# Patient Record
Sex: FEMALE | Race: WHITE | NOT HISPANIC OR LATINO | URBAN - METROPOLITAN AREA
[De-identification: names, ages, dates, MRNs, and addresses within clinical notes are randomized per-mention and may not be internally consistent; named-entity substitution may affect disease eponyms.]

---

## 2021-12-16 ENCOUNTER — EMERGENCY (EMERGENCY)
Facility: HOSPITAL | Age: 31
LOS: 1 days | Discharge: HOME | End: 2021-12-16
Attending: EMERGENCY MEDICINE | Admitting: EMERGENCY MEDICINE
Payer: COMMERCIAL

## 2021-12-16 VITALS
RESPIRATION RATE: 28 BRPM | OXYGEN SATURATION: 100 % | TEMPERATURE: 98 F | HEART RATE: 107 BPM | DIASTOLIC BLOOD PRESSURE: 78 MMHG | SYSTOLIC BLOOD PRESSURE: 112 MMHG

## 2021-12-16 DIAGNOSIS — U07.1 COVID-19: ICD-10-CM

## 2021-12-16 DIAGNOSIS — M79.10 MYALGIA, UNSPECIFIED SITE: ICD-10-CM

## 2021-12-16 PROCEDURE — 71045 X-RAY EXAM CHEST 1 VIEW: CPT | Mod: 26

## 2021-12-16 PROCEDURE — 99284 EMERGENCY DEPT VISIT MOD MDM: CPT | Mod: 25

## 2021-12-16 NOTE — ED ADULT NURSE NOTE - OBJECTIVE STATEMENT
32yo female, hysterical, hyperventilating and anxious, states "my boyfriend tested positive for COVID this morning and they said his viral load was through the roof... I've been in bed all day." Pt states symptoms of shortness of breath, sore throat and headache began after finding out boyfriend was positive and "it's all coming on so fast." Denies fevers/chills. SpO2 100% RA. Pt also states she has anxiety.

## 2021-12-17 VITALS — RESPIRATION RATE: 18 BRPM | OXYGEN SATURATION: 99 %

## 2021-12-17 LAB
ALBUMIN SERPL ELPH-MCNC: 4.1 G/DL — SIGNIFICANT CHANGE UP (ref 3.3–5)
ALP SERPL-CCNC: SIGNIFICANT CHANGE UP U/L (ref 40–120)
ALT FLD-CCNC: SIGNIFICANT CHANGE UP U/L (ref 10–45)
ANION GAP SERPL CALC-SCNC: 9 MMOL/L — SIGNIFICANT CHANGE UP (ref 5–17)
APPEARANCE UR: CLEAR — SIGNIFICANT CHANGE UP
AST SERPL-CCNC: SIGNIFICANT CHANGE UP U/L (ref 10–40)
BASOPHILS # BLD AUTO: 0.03 K/UL — SIGNIFICANT CHANGE UP (ref 0–0.2)
BASOPHILS NFR BLD AUTO: 0.4 % — SIGNIFICANT CHANGE UP (ref 0–2)
BILIRUB SERPL-MCNC: 0.2 MG/DL — SIGNIFICANT CHANGE UP (ref 0.2–1.2)
BILIRUB UR-MCNC: NEGATIVE — SIGNIFICANT CHANGE UP
BUN SERPL-MCNC: 11 MG/DL — SIGNIFICANT CHANGE UP (ref 7–23)
CALCIUM SERPL-MCNC: 9.2 MG/DL — SIGNIFICANT CHANGE UP (ref 8.4–10.5)
CHLORIDE SERPL-SCNC: 106 MMOL/L — SIGNIFICANT CHANGE UP (ref 96–108)
CO2 SERPL-SCNC: 21 MMOL/L — LOW (ref 22–31)
COLOR SPEC: YELLOW — SIGNIFICANT CHANGE UP
CREAT SERPL-MCNC: 0.66 MG/DL — SIGNIFICANT CHANGE UP (ref 0.5–1.3)
D DIMER BLD IA.RAPID-MCNC: <150 NG/ML DDU — SIGNIFICANT CHANGE UP
DIFF PNL FLD: NEGATIVE — SIGNIFICANT CHANGE UP
EOSINOPHIL # BLD AUTO: 0.13 K/UL — SIGNIFICANT CHANGE UP (ref 0–0.5)
EOSINOPHIL NFR BLD AUTO: 1.8 % — SIGNIFICANT CHANGE UP (ref 0–6)
GLUCOSE SERPL-MCNC: 106 MG/DL — HIGH (ref 70–99)
GLUCOSE UR QL: NEGATIVE — SIGNIFICANT CHANGE UP
HCG SERPL-ACNC: <0 MIU/ML — SIGNIFICANT CHANGE UP
HCT VFR BLD CALC: 40.3 % — SIGNIFICANT CHANGE UP (ref 34.5–45)
HGB BLD-MCNC: 13.1 G/DL — SIGNIFICANT CHANGE UP (ref 11.5–15.5)
IMM GRANULOCYTES NFR BLD AUTO: 0.4 % — SIGNIFICANT CHANGE UP (ref 0–1.5)
KETONES UR-MCNC: NEGATIVE — SIGNIFICANT CHANGE UP
LACTATE SERPL-SCNC: 1.1 MMOL/L — SIGNIFICANT CHANGE UP (ref 0.5–2)
LEUKOCYTE ESTERASE UR-ACNC: NEGATIVE — SIGNIFICANT CHANGE UP
LIDOCAIN IGE QN: 19 U/L — SIGNIFICANT CHANGE UP (ref 7–60)
LYMPHOCYTES # BLD AUTO: 0.84 K/UL — LOW (ref 1–3.3)
LYMPHOCYTES # BLD AUTO: 11.4 % — LOW (ref 13–44)
MAGNESIUM SERPL-MCNC: 2 MG/DL — SIGNIFICANT CHANGE UP (ref 1.6–2.6)
MCHC RBC-ENTMCNC: 28.6 PG — SIGNIFICANT CHANGE UP (ref 27–34)
MCHC RBC-ENTMCNC: 32.5 GM/DL — SIGNIFICANT CHANGE UP (ref 32–36)
MCV RBC AUTO: 88 FL — SIGNIFICANT CHANGE UP (ref 80–100)
MONOCYTES # BLD AUTO: 0.63 K/UL — SIGNIFICANT CHANGE UP (ref 0–0.9)
MONOCYTES NFR BLD AUTO: 8.6 % — SIGNIFICANT CHANGE UP (ref 2–14)
NEUTROPHILS # BLD AUTO: 5.69 K/UL — SIGNIFICANT CHANGE UP (ref 1.8–7.4)
NEUTROPHILS NFR BLD AUTO: 77.4 % — HIGH (ref 43–77)
NITRITE UR-MCNC: NEGATIVE — SIGNIFICANT CHANGE UP
NRBC # BLD: 0 /100 WBCS — SIGNIFICANT CHANGE UP (ref 0–0)
PH UR: 6 — SIGNIFICANT CHANGE UP (ref 5–8)
PLATELET # BLD AUTO: 274 K/UL — SIGNIFICANT CHANGE UP (ref 150–400)
POTASSIUM SERPL-MCNC: SIGNIFICANT CHANGE UP MMOL/L (ref 3.5–5.3)
POTASSIUM SERPL-SCNC: SIGNIFICANT CHANGE UP MMOL/L (ref 3.5–5.3)
PROT SERPL-MCNC: 7.5 G/DL — SIGNIFICANT CHANGE UP (ref 6–8.3)
PROT UR-MCNC: NEGATIVE MG/DL — SIGNIFICANT CHANGE UP
RBC # BLD: 4.58 M/UL — SIGNIFICANT CHANGE UP (ref 3.8–5.2)
RBC # FLD: 13.2 % — SIGNIFICANT CHANGE UP (ref 10.3–14.5)
SARS-COV-2 RNA SPEC QL NAA+PROBE: DETECTED
SODIUM SERPL-SCNC: 136 MMOL/L — SIGNIFICANT CHANGE UP (ref 135–145)
SP GR SPEC: 1.01 — SIGNIFICANT CHANGE UP (ref 1–1.03)
TROPONIN T SERPL-MCNC: <0.01 NG/ML — SIGNIFICANT CHANGE UP (ref 0–0.01)
UROBILINOGEN FLD QL: 0.2 E.U./DL — SIGNIFICANT CHANGE UP
WBC # BLD: 7.35 K/UL — SIGNIFICANT CHANGE UP (ref 3.8–10.5)
WBC # FLD AUTO: 7.35 K/UL — SIGNIFICANT CHANGE UP (ref 3.8–10.5)

## 2021-12-17 PROCEDURE — 87637 SARSCOV2&INF A&B&RSV AMP PRB: CPT

## 2021-12-17 PROCEDURE — 71045 X-RAY EXAM CHEST 1 VIEW: CPT

## 2021-12-17 PROCEDURE — 36415 COLL VENOUS BLD VENIPUNCTURE: CPT

## 2021-12-17 PROCEDURE — 83605 ASSAY OF LACTIC ACID: CPT

## 2021-12-17 PROCEDURE — 83735 ASSAY OF MAGNESIUM: CPT

## 2021-12-17 PROCEDURE — 84702 CHORIONIC GONADOTROPIN TEST: CPT

## 2021-12-17 PROCEDURE — 81003 URINALYSIS AUTO W/O SCOPE: CPT

## 2021-12-17 PROCEDURE — 96374 THER/PROPH/DIAG INJ IV PUSH: CPT

## 2021-12-17 PROCEDURE — 96361 HYDRATE IV INFUSION ADD-ON: CPT

## 2021-12-17 PROCEDURE — 85025 COMPLETE CBC W/AUTO DIFF WBC: CPT

## 2021-12-17 PROCEDURE — 71045 X-RAY EXAM CHEST 1 VIEW: CPT | Mod: 26

## 2021-12-17 PROCEDURE — U0005: CPT

## 2021-12-17 PROCEDURE — 84484 ASSAY OF TROPONIN QUANT: CPT

## 2021-12-17 PROCEDURE — 85379 FIBRIN DEGRADATION QUANT: CPT

## 2021-12-17 PROCEDURE — 83690 ASSAY OF LIPASE: CPT

## 2021-12-17 PROCEDURE — 80053 COMPREHEN METABOLIC PANEL: CPT

## 2021-12-17 PROCEDURE — U0003: CPT

## 2021-12-17 PROCEDURE — 99284 EMERGENCY DEPT VISIT MOD MDM: CPT | Mod: 25

## 2021-12-17 RX ORDER — SODIUM CHLORIDE 9 MG/ML
1000 INJECTION INTRAMUSCULAR; INTRAVENOUS; SUBCUTANEOUS ONCE
Refills: 0 | Status: COMPLETED | OUTPATIENT
Start: 2021-12-17 | End: 2021-12-17

## 2021-12-17 RX ORDER — KETOROLAC TROMETHAMINE 30 MG/ML
30 SYRINGE (ML) INJECTION ONCE
Refills: 0 | Status: DISCONTINUED | OUTPATIENT
Start: 2021-12-17 | End: 2021-12-17

## 2021-12-17 RX ADMIN — SODIUM CHLORIDE 1000 MILLILITER(S): 9 INJECTION INTRAMUSCULAR; INTRAVENOUS; SUBCUTANEOUS at 03:30

## 2021-12-17 RX ADMIN — Medication 30 MILLIGRAM(S): at 04:05

## 2021-12-17 RX ADMIN — SODIUM CHLORIDE 1000 MILLILITER(S): 9 INJECTION INTRAMUSCULAR; INTRAVENOUS; SUBCUTANEOUS at 02:16

## 2021-12-17 RX ADMIN — Medication 30 MILLIGRAM(S): at 02:16

## 2021-12-17 RX ADMIN — Medication 0.5 MILLIGRAM(S): at 02:16

## 2021-12-17 NOTE — ED PROVIDER NOTE - PHYSICAL EXAMINATION
CONSTITUTIONAL: NAD but appears anxious  SKIN: Normal color and turgor.    HEAD: NC/AT.  EYES: Conjunctiva clear. EOMI. PERRL.    ENT: Airway clear. Normal voice.   RESPIRATORY:  Normal work of breathing. Lungs CTAB.  CARDIOVASCULAR:  RRR, S1S2. No M/R/G.      GI:  Abdomen soft, nontender.    MSK: Neck supple.  No lower extremity edema or calf tenderness.  No joint swelling or ROM limitation.  NEURO: Alert and oriented; CN II-XII grossly intact. Speech clear. 5/5 strength in all extremities.  Good balance. Steady gait.

## 2021-12-17 NOTE — ED PROVIDER NOTE - NSFOLLOWUPINSTRUCTIONS_ED_ALL_ED_FT
Quarantine at home, rest, stay hydrated.  Check pulse oximetry twice daily.  Ibuprofen if needed for fever/pain, use as directed.  Call primary care physician tomorrow.  Return to the Emergency Department if you have any new or worsening symptoms, or if you have any concerns.  ======================    Oximetry    Oximetry is a technology that measures the oxygen level in the blood. This is also known as oxygen saturation level. This measurement is taken with a device called a pulse oximeter. This device also measures the heart rate (pulse). The measurement helps to assess:  •A person's oxygen level and breathing.      •The need for or effectiveness of oxygen therapy or other treatments for lung disease.      •How well a person can tolerate increased activity.      If a more accurate measurement is needed, a blood sample will be taken.      How is an oximetry reading obtained?   •A tape sensor or clip with a light source and a light detector will be placed on an area of the body.  •For children and adults, the sensor is usually placed on a finger, with the light centered over the nail bed. The sensor may also be placed on an earlobe or toe.      •For babies, a tape sensor is usually placed around areas such as the sole of a foot or the palm of a hand.        •The device will beam light through the skin and blood. This cannot be felt.      •The levels of light received by the detector will be measured, and the percentage of blood cells carrying oxygen will be determined.      Oximetry may be used continuously or may be used at specified intervals.      Are there any risks associated with oximetry?    The risks associated with oximetry are rare. However, there is a risk of skin breakdown if a sensor is left in the same spot for long periods of time.      What can affect the accuracy of the oximetry reading?  Pulse oximetry depends on the amount of light absorbed as it passes through skin tissue. Because of this, the accuracy of this measurement can be affected by one or more of the following:•Factors such as:  •Dark nail polish or artificial nails.      •Very dark skin.      •Shivering or too much movement.      •Bright, artificial lighting.      •Long-term, or chronic, smoking and recent breathing-in (inhalation) of smoke or carbon monoxide.      •Conditions such as:  •Extreme coolness or poor blood flow to the area where the sensor is placed.      •Excessive sweating or extreme warmth of the area where the sensor is placed.      •Anemia, or low levels of hemoglobin or red blood cells.      •Polycythemia vera. This is a bone marrow disease that causes high levels of red blood cells, white blood cells, and platelets.          What is the meaning of the oximetry reading?  The normal value depends on your medical history and your elevation above sea level.  •Most healthy people have oxygen saturation levels between 95% and 100%.      •Low oxygen saturation levels are below 90%. This may happen in people with lung conditions, such as chronic obstructive pulmonary disease (COPD). In this case, supplemental oxygen therapy may be needed.        Summary    •Oximetry uses a small device to measure the oxygen level in the blood.      •The light in the sensor cannot be felt. The risks associated with oximetry are rare.      •Most healthy people have oxygen levels between 95% and 100%. A low oxygen saturation level is below 90%.      •People with low oxygen levels may need supplemental oxygen.      This information is not intended to replace advice given to you by your health care provider. Make sure you discuss any questions you have with your health care provider.

## 2021-12-17 NOTE — ED PROVIDER NOTE - PATIENT PORTAL LINK FT
You can access the FollowMyHealth Patient Portal offered by [facility] by registering at the following website: [email or web address]. By joining [employer]’s FollowMyHealth portal, you will also be able to view your health information using other applications (apps) compatible with our system.

## 2021-12-17 NOTE — ED PROVIDER NOTE - NS ED ROS FT
CONSTITUTIONAL: fever  NEURO: No headache, no dizziness, no syncope; No focal weakness/tingling/numbness  EYES: No visual changes  ENT: Nasal congestion and sore throat  PULM: No cough or dyspnea  CV: No chest pain or palpitations  GI: No abdominal pain, vomiting, or diarrhea  : No dysuria, hematuria, frequency  MSK: No neck pain or back pain, no joint pain  SKIN: no rash or unusual bruising CONSTITUTIONAL: fever  NEURO: No headache, no dizziness, no syncope; No focal weakness/tingling/numbness  EYES: No visual changes  ENT: Nasal congestion and sore throat  PULM: No cough or dyspnea  CV: No chest pain or palpitations  GI: abd cramps, no NVD  : No dysuria, hematuria, frequency  MSK: No neck pain or back pain, no joint pain  SKIN: no rash or unusual bruising

## 2021-12-17 NOTE — ED PROVIDER NOTE - OBJECTIVE STATEMENT
32 yo fem with hx anxiety, depression, ADD  c/o body aches, feeling hot, nasal congstion and diff breathing through nose for apx 12-14 hrs.  says her live-in boyfriend tested positive for covid today, and he is asymptomatic.  she has no cough.  says she has some pain across the chest sometimes, and some pain in the LUQ of abdomen sometimes, but she does not have these symptoms currently. the pain in abdomen was crampy, but there has been no NVD. on arrival, pt was reportedly hyperventilating in triage, and patient confirms this.      no hx of DVT/PE, heart disease, HTN, HLD, DM, asthma/lung disease.  never smoker  no estrogen use, no recent immobilization, no malignancy, not pregnant, no leg pain or swelling    meds: duloxetine, adderall,  alprazolam  vaccinated for covid, no booster 30 yo fem with hx anxiety, depression, ADD  c/o body aches, feeling hot, nasal congstion and diff breathing through nose for apx 12-14 hrs.  has a sore throat. says her live-in boyfriend tested positive for covid today, and he is asymptomatic.  she has no cough.  reports some pain in the LUQ of abdomen sometimes, but none currently. the pain in abdomen was crampy, but there has been no NVD. on arrival, pt was reportedly hyperventilating in triage, and patient confirms this.      no hx of DVT/PE, heart disease, HTN, HLD, DM, asthma/lung disease.  never smoker  no estrogen use, no recent immobilization, no malignancy, not pregnant, no leg pain or swelling    meds: duloxetine, adderall,  alprazolam  vaccinated for covid, no booster

## 2021-12-17 NOTE — ED PROVIDER NOTE - ATTENDING CONTRIBUTION TO CARE
31F hx anxiety/depression, add, c/o bodyaches, congestion, subjective fevers.  pt states difficult to breath through her nose for past 21hrs.  states boyfriend tested positive for covid.  no n/v/d. no cough.   gen- nad  heent- ncat, clear conj  cv -rrr  lungs -ctab  abd - soft, nt, nd  ext -wwp, no edema  neuro -aox3, steady gait, subramanian  no resp distress, speaking in full sentences, no hypoxia, labs sent. given toradol, ativan, ivf.

## 2021-12-17 NOTE — ED PROVIDER NOTE - CLINICAL SUMMARY MEDICAL DECISION MAKING FREE TEXT BOX
pt with 1 day fever, body aches, nasal congestion.  strongly suspicious for viral resp infection.  tachycardic initially and hyperventilation in triage.  on exam appears nontoxic.  very anxious. euvolemic, lungs clear, abd benign, no calf tend or swelling.  plan: IV fluids, NSAID, low dose anxiolytic. will test for covid, basic labs, CXR.  low clinical susp for PE, will check d-dimer.

## 2024-07-26 ENCOUNTER — APPOINTMENT (OUTPATIENT)
Age: 34
End: 2024-07-26

## 2024-07-26 VITALS
WEIGHT: 150.31 LBS | DIASTOLIC BLOOD PRESSURE: 71 MMHG | OXYGEN SATURATION: 97 % | HEIGHT: 55 IN | HEART RATE: 99 BPM | SYSTOLIC BLOOD PRESSURE: 97 MMHG | BODY MASS INDEX: 34.79 KG/M2

## 2024-07-26 DIAGNOSIS — Z01.419 ENCOUNTER FOR GYNECOLOGICAL EXAMINATION (GENERAL) (ROUTINE) W/OUT ABNORMAL FINDINGS: ICD-10-CM

## 2024-07-26 DIAGNOSIS — Z80.3 FAMILY HISTORY OF MALIGNANT NEOPLASM OF BREAST: ICD-10-CM

## 2024-07-26 DIAGNOSIS — N80.9 ENDOMETRIOSIS, UNSPECIFIED: ICD-10-CM

## 2024-07-26 PROBLEM — Z00.00 ENCOUNTER FOR PREVENTIVE HEALTH EXAMINATION: Status: ACTIVE | Noted: 2024-07-26

## 2024-07-26 PROCEDURE — 99204 OFFICE O/P NEW MOD 45 MIN: CPT

## 2024-07-26 PROCEDURE — 99459 PELVIC EXAMINATION: CPT

## 2024-07-26 NOTE — PLAN
[FreeTextEntry1] : -Pap + cotesting -Mammogram referral/ultrasound - MRI pelvis - Vaginitis panel  RTO for FU with Dr. Mtz for endometriosis vs. hyst consult

## 2024-07-26 NOTE — PHYSICAL EXAM
[24508] : A chaperone was present during the pelvic exam. [FreeTextEntry2] : MICHELLE Marley [Appropriately responsive] : appropriately responsive [Alert] : alert [Examination Of The Breasts] : a normal appearance [No Masses] : no breast masses were palpable [Normal] : normal [FreeTextEntry4] : +odor [FreeTextEntry8] : 7week sized, mobile, nontender

## 2024-07-26 NOTE — HISTORY OF PRESENT ILLNESS
[FreeTextEntry1] : 32yo G0 LMP: 22 here for WWE and discuss interest in hysterectomy. Patient states that she has had long history of very painful menses that have caused her to faint from pain and causing her to miss work/school and have inability to function 2/2 pain for years with multiple ED visits for pain control. Currently, dysmenorrhea does not happen every menstrual period, but is interested in proceeding with hysterectomy due to not wanting children in the future. Took OCPs years ago but stopped, states tylenol/ibuprofen causes no relief, never tried [place] or IUD, no sx for official endo diagnosis, and not currently interested. Patient denies pain on intercourse or bowel movements, or pain outside of menses. Patient discussed that possible endo excision vs. hysterectomy. Patient also has family history of mother being diagnosed with breast cancer in 30s, with unknown genetic testing. Will do today. Discussed will need additional imaging +/- endo sampling.   OB: G0 LMP: 22, regular (h/o irregular in the past), pap smear  abnormal pap smear, s/p gardasil  PMHx: endometriosis, MDD, ADHD - follows with psychiatrist, currently looking for therapist Sx: denies NKDA Meds: vyvanse 30mg, Duloxitine 60mg  NKDA FHx: mom- breast cancer dx late 30s  in 40s unsure of genetic testing Social : currently engaged, marijuana during menses for pain managment Mammogram - normal, last year, referral placed

## 2024-07-30 LAB — HPV HIGH+LOW RISK DNA PNL CVX: NOT DETECTED

## 2024-07-31 DIAGNOSIS — N76.0 ACUTE VAGINITIS: ICD-10-CM

## 2024-07-31 DIAGNOSIS — B96.89 ACUTE VAGINITIS: ICD-10-CM

## 2024-07-31 LAB
A VAGINAE DNA VAG QL NAA+PROBE: ABNORMAL
BVAB2 DNA VAG QL NAA+PROBE: ABNORMAL
C KRUSEI DNA VAG QL NAA+PROBE: NEGATIVE
C TRACH RRNA SPEC QL NAA+PROBE: NEGATIVE
CANDIDA DNA VAG QL NAA+PROBE: NEGATIVE
CYTOLOGY CVX/VAG DOC THIN PREP: ABNORMAL
MEGA1 DNA VAG QL NAA+PROBE: NORMAL
N GONORRHOEA RRNA SPEC QL NAA+PROBE: NEGATIVE
T VAGINALIS RRNA SPEC QL NAA+PROBE: NEGATIVE

## 2024-08-28 ENCOUNTER — APPOINTMENT (OUTPATIENT)
Age: 34
End: 2024-08-28

## 2024-08-28 PROCEDURE — 99443: CPT | Mod: 93

## 2024-09-23 ENCOUNTER — APPOINTMENT (OUTPATIENT)
Dept: BREAST CENTER | Facility: CLINIC | Age: 34
End: 2024-09-23

## 2024-09-23 VITALS
SYSTOLIC BLOOD PRESSURE: 114 MMHG | HEART RATE: 84 BPM | DIASTOLIC BLOOD PRESSURE: 81 MMHG | BODY MASS INDEX: 25.78 KG/M2 | HEIGHT: 64 IN | WEIGHT: 151 LBS

## 2024-09-23 DIAGNOSIS — Z80.42 FAMILY HISTORY OF MALIGNANT NEOPLASM OF PROSTATE: ICD-10-CM

## 2024-09-23 DIAGNOSIS — Z15.09 GENETIC SUSCEPTIBILITY TO MALIGNANT NEOPLASM OF BREAST: ICD-10-CM

## 2024-09-23 DIAGNOSIS — Z15.01 GENETIC SUSCEPTIBILITY TO MALIGNANT NEOPLASM OF BREAST: ICD-10-CM

## 2024-09-23 DIAGNOSIS — Z78.9 OTHER SPECIFIED HEALTH STATUS: ICD-10-CM

## 2024-09-23 PROCEDURE — 99205 OFFICE O/P NEW HI 60 MIN: CPT

## 2024-09-23 RX ORDER — DULOXETINE HYDROCHLORIDE 60 MG/1
60 CAPSULE, DELAYED RELEASE PELLETS ORAL
Refills: 0 | Status: ACTIVE | COMMUNITY

## 2024-09-23 RX ORDER — LISDEXAMFETAMINE 30 MG/1
30 CAPSULE ORAL
Refills: 0 | Status: ACTIVE | COMMUNITY

## 2024-09-23 NOTE — PLAN
[TextEntry] : Bilateral screening mammogram and sonogram due now Bilateral breast MRI to be done in March 2025 Referral to gynecology oncology Patient cleared to proceed with partial hysterectomy Patient to perform self-breast exams as instructed in the office. Patient to follow-up in 6 months after imaging completed, unless breast imaging returns as abnormal

## 2024-09-23 NOTE — ASSESSMENT
[FreeTextEntry1] : 33-year-old female with a pathogenic BRCA2 mutation and family history of breast cancer in her mother

## 2024-09-23 NOTE — FAMILY HISTORY
[TextEntry] : Family history of breast cancer in mother at age 36. Denies family history of ovarian cancer. Denies family history of prostate cancer.

## 2024-09-23 NOTE — PAST MEDICAL HISTORY
[Menarche Age ____] : age at menarche was [unfilled] [Definite ___ (Date)] : the last menstrual period was [unfilled] [Total Preg ___] : G[unfilled] [History of Hormone Replacement Treatment] : has no history of hormone replacement treatment [FreeTextEntry6] : No [FreeTextEntry7] : No [FreeTextEntry8] : N/A

## 2024-10-15 ENCOUNTER — NON-APPOINTMENT (OUTPATIENT)
Age: 34
End: 2024-10-15

## 2024-10-15 ENCOUNTER — APPOINTMENT (OUTPATIENT)
Dept: GYNECOLOGIC ONCOLOGY | Facility: CLINIC | Age: 34
End: 2024-10-15

## 2024-10-15 VITALS
SYSTOLIC BLOOD PRESSURE: 114 MMHG | WEIGHT: 151 LBS | HEIGHT: 64 IN | TEMPERATURE: 97.2 F | HEART RATE: 97 BPM | DIASTOLIC BLOOD PRESSURE: 79 MMHG | OXYGEN SATURATION: 96 % | BODY MASS INDEX: 25.78 KG/M2

## 2024-10-15 PROCEDURE — 99205 OFFICE O/P NEW HI 60 MIN: CPT

## 2024-10-15 PROCEDURE — 99459 PELVIC EXAMINATION: CPT

## 2025-02-28 ENCOUNTER — NON-APPOINTMENT (OUTPATIENT)
Age: 35
End: 2025-02-28

## 2025-03-03 ENCOUNTER — APPOINTMENT (OUTPATIENT)
Dept: BREAST CENTER | Facility: CLINIC | Age: 35
End: 2025-03-03